# Patient Record
Sex: FEMALE | Race: BLACK OR AFRICAN AMERICAN | URBAN - METROPOLITAN AREA
[De-identification: names, ages, dates, MRNs, and addresses within clinical notes are randomized per-mention and may not be internally consistent; named-entity substitution may affect disease eponyms.]

---

## 2020-11-27 ENCOUNTER — OUT OF OFFICE VISIT (OUTPATIENT)
Dept: URBAN - METROPOLITAN AREA MEDICAL CENTER 8 | Facility: MEDICAL CENTER | Age: 40
End: 2020-11-27
Payer: SELF-PAY

## 2020-11-27 DIAGNOSIS — R10.13 ABDOMINAL DISCOMFORT, EPIGASTRIC: ICD-10-CM

## 2020-11-27 DIAGNOSIS — R74.8 ABNORMAL ALKALINE PHOSPHATASE TEST: ICD-10-CM

## 2020-11-27 DIAGNOSIS — R93.3 ABN FINDINGS-GI TRACT: ICD-10-CM

## 2020-11-27 DIAGNOSIS — B17.9 ACUTE HEPATITIS: ICD-10-CM

## 2020-11-27 PROCEDURE — 99233 SBSQ HOSP IP/OBS HIGH 50: CPT

## 2020-11-27 PROCEDURE — 99255 IP/OBS CONSLTJ NEW/EST HI 80: CPT

## 2020-11-29 ENCOUNTER — OUT OF OFFICE VISIT (OUTPATIENT)
Dept: URBAN - METROPOLITAN AREA MEDICAL CENTER 8 | Facility: MEDICAL CENTER | Age: 40
End: 2020-11-29
Payer: SELF-PAY

## 2020-11-29 DIAGNOSIS — B17.9 ACUTE HEPATITIS: ICD-10-CM

## 2020-11-29 DIAGNOSIS — R93.3 ABN FINDINGS-GI TRACT: ICD-10-CM

## 2020-11-29 PROCEDURE — 99232 SBSQ HOSP IP/OBS MODERATE 35: CPT | Performed by: INTERNAL MEDICINE

## 2020-11-30 ENCOUNTER — TELEPHONE ENCOUNTER (OUTPATIENT)
Dept: URBAN - METROPOLITAN AREA CLINIC 92 | Facility: CLINIC | Age: 40
End: 2020-11-30

## 2020-12-02 LAB
ALBUMIN: 4.7
ALKALINE PHOSPHATASE: 191
ALT (SGPT): 377
AST (SGOT): 132
BILIRUBIN, DIRECT: 0.35
BILIRUBIN, TOTAL: 0.8
PROTEIN, TOTAL: 7.9

## 2020-12-03 ENCOUNTER — TELEPHONE ENCOUNTER (OUTPATIENT)
Dept: URBAN - METROPOLITAN AREA CLINIC 92 | Facility: CLINIC | Age: 40
End: 2020-12-03

## 2020-12-09 LAB
ALBUMIN: 4.6
ALKALINE PHOSPHATASE: 116
ALT (SGPT): 57
AST (SGOT): 33
BILIRUBIN, DIRECT: 0.28
BILIRUBIN, TOTAL: 0.8
PROTEIN, TOTAL: 8.1

## 2020-12-14 ENCOUNTER — OFFICE VISIT (OUTPATIENT)
Dept: URBAN - METROPOLITAN AREA CLINIC 25 | Facility: CLINIC | Age: 40
End: 2020-12-14

## 2020-12-15 ENCOUNTER — OFFICE VISIT (OUTPATIENT)
Dept: URBAN - METROPOLITAN AREA CLINIC 25 | Facility: CLINIC | Age: 40
End: 2020-12-15
Payer: SELF-PAY

## 2020-12-15 ENCOUNTER — DASHBOARD ENCOUNTERS (OUTPATIENT)
Age: 40
End: 2020-12-15

## 2020-12-15 DIAGNOSIS — R79.89 ELEVATED LFTS: ICD-10-CM

## 2020-12-15 PROCEDURE — G8417 CALC BMI ABV UP PARAM F/U: HCPCS | Performed by: INTERNAL MEDICINE

## 2020-12-15 PROCEDURE — 99213 OFFICE O/P EST LOW 20 MIN: CPT | Performed by: INTERNAL MEDICINE

## 2020-12-15 PROCEDURE — G8483 FLU IMM NO ADMIN DOC REA: HCPCS | Performed by: INTERNAL MEDICINE

## 2020-12-15 PROCEDURE — G8427 DOCREV CUR MEDS BY ELIG CLIN: HCPCS | Performed by: INTERNAL MEDICINE

## 2020-12-15 PROCEDURE — G9903 PT SCRN TBCO ID AS NON USER: HCPCS | Performed by: INTERNAL MEDICINE

## 2020-12-15 NOTE — HPI-TODAY'S VISIT:
Patient seen at ED a few weeks ago.  She presented with abdominal pain and elevated LFT's.  US was normal.  The labs were trending down so she was d/steffany to home.  She had outpatient labs on 11/30 and 12/3.  Labs from 12/3 show normal LFT's except an ALT of 57.  Overall she feels well.  She denies anorexia or weight loss. She denies abdominal pain. She denies UGI symptoms.  She denies LGI symptoms.  She denies LGI bleed or melena. MRI at [facility] had fatty liver and gallstones.  There were a few benign cysts in the pancreas.  There was no evidence of cholecystitis.  The Radiologist was recommending repeat MRI in 1-2 years. Serologic w/u was negative except for slightly elevated IgG levels.  ANAM/ASMA/AMA were normal.  She is immune to Hep A and Hep B and Hep C antibody was negative

## 2020-12-16 LAB
ALBUMIN: 4.8
ALKALINE PHOSPHATASE: 95
ALT (SGPT): 33
AST (SGOT): 35
BILIRUBIN, DIRECT: 0.21
BILIRUBIN, TOTAL: 0.5
PROTEIN, TOTAL: 7.8